# Patient Record
Sex: MALE | Race: WHITE | NOT HISPANIC OR LATINO | Employment: FULL TIME | URBAN - METROPOLITAN AREA
[De-identification: names, ages, dates, MRNs, and addresses within clinical notes are randomized per-mention and may not be internally consistent; named-entity substitution may affect disease eponyms.]

---

## 2022-02-21 ENCOUNTER — TELEPHONE (OUTPATIENT)
Dept: OPHTHALMOLOGY | Facility: CLINIC | Age: 52
End: 2022-02-21

## 2022-02-21 NOTE — TELEPHONE ENCOUNTER
Spoke to patient scheduled appt for ED f/u. Patient requested a morning this week in [place] offered sooner in [place] but patient stated he could wait. Scheduled Wednesday with Dr. Anaay in the AM per patient request.

## 2022-02-23 ENCOUNTER — OFFICE VISIT (OUTPATIENT)
Dept: OPTOMETRY | Facility: CLINIC | Age: 52
End: 2022-02-23

## 2022-02-23 DIAGNOSIS — T15.02XD: Primary | ICD-10-CM

## 2022-02-23 PROCEDURE — 99999 PR PBB SHADOW E&M-EST. PATIENT-LVL III: CPT | Mod: PBBFAC,,, | Performed by: OPTOMETRIST

## 2022-02-23 PROCEDURE — 65222 PR REMV F.B.,EYE,CORNEA,SLIT LAMP: ICD-10-PCS | Mod: S$PBB,LT,, | Performed by: OPTOMETRIST

## 2022-02-23 PROCEDURE — 99999 PR PBB SHADOW E&M-EST. PATIENT-LVL III: ICD-10-PCS | Mod: PBBFAC,,, | Performed by: OPTOMETRIST

## 2022-02-23 PROCEDURE — 92002 INTRM OPH EXAM NEW PATIENT: CPT | Mod: 25,S$PBB,, | Performed by: OPTOMETRIST

## 2022-02-23 PROCEDURE — 92002 PR EYE EXAM, NEW PATIENT,INTERMED: ICD-10-PCS | Mod: 25,S$PBB,, | Performed by: OPTOMETRIST

## 2022-02-23 PROCEDURE — 65222 REMOVE FOREIGN BODY FROM EYE: CPT | Mod: S$PBB,LT,, | Performed by: OPTOMETRIST

## 2022-02-23 PROCEDURE — 65222 REMOVE FOREIGN BODY FROM EYE: CPT | Mod: PBBFAC,PO | Performed by: OPTOMETRIST

## 2022-02-23 PROCEDURE — 99213 OFFICE O/P EST LOW 20 MIN: CPT | Mod: PBBFAC,PO | Performed by: OPTOMETRIST

## 2022-02-23 RX ORDER — NEOMYCIN SULFATE, POLYMYXIN B SULFATE AND DEXAMETHASONE 3.5; 10000; 1 MG/ML; [USP'U]/ML; MG/ML
1 SUSPENSION/ DROPS OPHTHALMIC 4 TIMES DAILY
Qty: 5 ML | Refills: 0 | Status: SHIPPED | OUTPATIENT
Start: 2022-02-23 | End: 2022-02-28

## 2022-02-23 NOTE — PROGRESS NOTES
HPI     Pt. Was working under a truck 1 wk ago last Wednesday when something fell   in OS.  Pt. Went to [facility] ER Monday and the  Flushed OS out and told him   to follow up here.    + redness, headache behind OS, FBS sensation, Pain, blurry VA, and   irritation. States VA is less blurry since Monday after ER visit.    EES brenden TID-QID OS    Last edited by Mary Yancey on 2/23/2022  9:22 AM. (History)        ROS     Positive for: Eyes    Negative for: Constitutional, Gastrointestinal, Neurological, Skin,   Genitourinary, Musculoskeletal, HENT, Endocrine, Cardiovascular,   Respiratory, Psychiatric, Allergic/Imm, Heme/Lymph    Last edited by ZAY Anaya, OD on 2/23/2022  9:41 AM. (History)        Assessment /Plan     For exam results, see Encounter Report.    Corneal foreign body with residual material, left, subsequent encounter  -     neomycin-polymyxin-dexamethasone (MAXITROL) 3.5mg/mL-10,000 unit/mL-0.1 % DrpS; Place 1 drop into the left eye 4 (four) times daily. 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

## 2022-02-23 NOTE — PATIENT INSTRUCTIONS
"DRY EYES -- BURNING OR AYAAN SYMPTOMS:  Use Over The Counter artificial tears as needed for dry eye symptoms.   Some common brands include:  Systane, Optive, Refresh, and Thera-Tears.  These drops can be used as frequently as desired, but may be most helpful use during long periods of concentrated work.  For example, reading / working at the computer. Start with 3-4x per day.     Nighttime Ophthalmic gel or ointments are available: Refresh PM, Genteal, and Lacrilube.    Avoid drops that "get redness out" (Visine, Murine, Clear Eyes), as these may contain medication that could further irritate the eyes, especially with chronic use.    ALLERGY EYES -- ITCHING SYMPTOMS:  Over the counter medications include--Pataday, Zaditor, and Alaway.  Use as directed 1-2 drops daily for symptoms of itching / watering eyes.  These drops will not help for dry eye or exposure symptoms.    REDNESS RELIEF:  Lumify---is a good redness reliever that will not cause irritation if used chronically.          "